# Patient Record
Sex: MALE | Race: WHITE
[De-identification: names, ages, dates, MRNs, and addresses within clinical notes are randomized per-mention and may not be internally consistent; named-entity substitution may affect disease eponyms.]

---

## 2018-06-19 ENCOUNTER — HOSPITAL ENCOUNTER (OUTPATIENT)
Dept: HOSPITAL 95 - LAB SHORT | Age: 78
Discharge: HOME | End: 2018-06-19
Attending: OPHTHALMOLOGY
Payer: MEDICARE

## 2018-06-19 DIAGNOSIS — H10.231: Primary | ICD-10-CM

## 2021-04-23 NOTE — NUR
PATIENT IS A NEW ADMIT FROM THE ED. THREE PERSON TRANSFER FROM [facility] TO BED.
AXOX 3 TO SELF, PLACE, SPOUSE, AND PRESIDENT BUT NO YEAR/MONTH. RIGHT SIDE
DEFICIT ARM/LEG. SPOUSE PRESENT IN ROOM AND STAYING AS INTERPRETOR PER CHARGE
RN. ARTIFICIAL RIGHT EYE. ON ROOM AIR. MRI SHEET FILLED OUT, SIGNED, AND
FAXED. BEDREST AND NPO AT THIS TIME WITH SLURRED SPEECH. SPOUSE AND PATIENT
ORIENTED TO ROOM AND CALL LIGHT SYSTEM. BED ALARM ACTIVATED WITH HX OF FALLS.
SPOUSE REPORTS SHE WILL BE UNABLE TO CARE FOR HIM AT HOME. PATIENT INDEPENDENT
PRIOR TO EVENT.

## 2021-04-23 NOTE — NUR
SHIFT SUMMARY:
 
NO ACUTE EVENTS. PT ATTEMPTED TO WORK WITH PHYSICAL AND OCCUPATIONAL THERAPY,
BUT BECAME TOO DIZZY TO PARTICIPATE. HEAD MRI AND CAROTID U/S COMPLETED.
TOLERATING PO INTAKE. DENIED PAIN. USING URINAL WITH ASSISTANCE. WIFE AT
BEDSIDE, IS ALLOWED TO SPEND THE NIGHT AS PT SOMETIMES BECOMES CONFUSED.

## 2021-04-24 NOTE — NUR
PT IS A/OX3, PLEASANT AND COOPERATIVE, THE PT IS UP WITH MINIMAL ASSIST TO THE
CHAIR, THE PT APPEARS TO BE BREATHING EASILY ON RA AT THIS TIME, THE PT WAS UP
IN THE CHAIR FOR MOST OF THE DAY, THE PTS WIFE IS AT THE BEDSIDE, TODAY THE PT
WAS ABLE TO WORK WITH THE PHYSICAL THERAPIST AND AMBULATED IN THE ROOM WITH
THE FWW, NO DIZZINESS WAS REPORTED, THE PT WAS STARTED ON BOWEL CARE TODAY PER
HIS REQUEST, CALL LIGHT IN REACH, WILL CONTINUE TO MONITOR AND ASSESS FOR
CHANGES

## 2021-04-24 NOTE — NUR
SHIFT SUMMARY
ASSUMED CARE OF PT AT 1900. 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
NODS HIS HEAD. [place]. LUNG SOUNDS HAVE CRACKLES AT THE BASES. HEART SOUNDS
REGULAR. PT HAS R SIDED WEAKNESS, R ARM IS FLACCID. PT SET THE BED ALARM ONCE
DURING THE SHIFT TO GO TO THE BATHRROM. PT WIFE STAYED WITH PATIENT T/O THE
NIGHT.
 
CALL LIGHT IN REACH, BED IN LOWEST POSTION.

## 2021-04-24 NOTE — NUR
Pt up in chair participating in conversation. Wife is very anxious about
conversation with nursing and pysisicans. She promised him she would never put
him in a nursing home.Slow and carefully reviewed care at the facilities and
that ther is along terms side for care if she sould ever need that service and
the rehab part of the famility and the skills and care provide. She was
relieved but may need reenfocement.
  Lengthy conversation with wife. 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
care for their son five years ago he passed here in this facility. Theraputic
time with them both.
  We also had a conversation about a life plan on advanced directives. Pt and
wife state they live in their own home and have been functioning well. Asked
if they had a spoke to their daughter or had a plan for chilo changes in the
future they admistted they did not. Pt wife stated they do not want to face it
or leave their home. Asked if they wanted information and they agreed. We
reviewed the different levels of care and stategies for staying independent.
Stongly suggested they speak openly with their family and loved ones about
their future care needs. Pt wife stated they have a living trust and advance
directive and she will bring it in. They did not want to talk about code
status. She did ask about care in the home. We setled on agreeing to me
speaking with their daughter tomorrow about future care needs.
  Will monitor pt for symtoms and will attempt a polst and advance directive.

## 2021-04-25 NOTE — NUR
PT IS ALOX3, PLEASANT AND COOPERATIVE, ABLE TO ANSWER QUESTIONS APPROPRIATLY
WITH SOME SPEECH ASPHASIS, THE PT APPEARS TO BE BREATHING EASILY ON RA AT THIS
TIME, THE PT DENIED ANY PAIN T/O THE DAY, THE PT THIS AFTERNOON WAS ABLE TO
AMBULATE AROUND THE MEDICAL FLOOR LOOP USEING THE FWW, MILDLY SOB ON RETURN TO
THE ROOM BUT WAS ABLE TO RECOVER QUIKLY, THE PTS WIFE WAS AT THE BEDSIDE T/O
THE DAY, CALL LIGHT IN REACH, PLAN FOR DISCHARGE TO SNF TOMORROW

## 2021-04-25 NOTE — NUR
SHIFT SUMMARY
ASSUMED CARE OF PT AT 1900. PT IS A/OX4. HEART SOUNDS REGULAR, LUNG SOUNDS
HAVE CRACKLES AT THE BASES. PT HAS NO NEW COMPLAINTS BUT SAYS THAT HE FEELS
ABOUT THE SAME AS YESTERDAY. PT STILL HAVING TROUBLE WITH EXPRESSIVE APHASIA,
PT IS A 1P ASSIST TO BSC.
 
CALL LIGHT IN REACH, BED IN LOWEST POSTION.

## 2021-04-26 NOTE — NUR
PT IS A/OX3, PLEASANT AND COOPERATIVE, THE PT IS UP WITH ASSIST TO THE
BATHROOM USEING THE FWW, THE PT APPEARS TO BE BREATHING EASILY ON RA AT REST,
MILDLY SOB WITH EXCERTION, THE PT WORTED WITH THE PHYSICAL, OCCUPATIONAL AND
SPEECH THERAPIST TODAY AND WAS ABLE TO TOLERATE, THE PTS WIFE WAS AT THE
BEDSIDE T/O THE DAY, PLAN IS FOR THE PT TO DISCHARGE TO SNF EITHER TODAY OR
TOMMOROW IF HE QUALIFEIS, CALL LIGHT IN REACH, WILL CONTINUE TO MONITOR AND
ASSESS FOR CHANGES

## 2021-04-26 NOTE — NUR
SHIFT SUMMARY
PT AWAKE OFF AND ON THROUGHOUT THE NIGHT. UP APPROX EVERY HOUR TO USE THE
RESTROOM. CALLED APPROPRIATELY. HAD THREE BOWEL MOVEMENTS, ALL SMALL, TAN, AND
UNFORMED. BOWEL CARE HELD THIS EVENING. PT HAS R SIDED WEAKNESS. UNABLE TO
UNBEND R ARM OR SQUEEZE MY HAND WITH HIS R HAND. WAS ABLE TO LIFT R ARM UP. R
LEG WEAK. SPEECH IS SLURRED WITH SOME EXPRESSIVE APHASIA. A/O X 3-4 TONIGHT.
WIFE REMAINED AT BEDSIDE DUE TO PT HAVING HX OF GETTING CONFUSED. PT DENIED
PAIN. VITAL SIGNS STABLE. WILL CONTINUE TO MONITOR AND REPORT TO DAY RN.

## 2021-04-27 NOTE — NUR
REPORT CALLED TO RON AT [facility].  PT TO [facility] VIA W/C WITH [facility] W/C SERVICE TRANSPORTING.  WIFE TOOK BELONGINGS WITH HER.

## 2021-04-27 NOTE — NUR
CTA/NURSING STUDENT
I HAVE ASSESSED THIS PT. I HAVE READ THE NURSING STUDENT'S DOCUMENTATION AND I
AGREE WITH IT. SHIFT SUMMARY IN NURSING STUDENT NOTES

## 2021-04-27 NOTE — NUR
SHIFT SUMMARY
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 FULL CODE. PLAN FOR
DISCHARGE TODAY TO SNF OR WITH HOME HEALTH. PT APPEARED INCREASINGLY AGITATED
WITH ASSISTANCE THROUGHOUT THE SHIFT. MULTIPLE RESTROOM REQUESTS. PT IS
SLEEPING AT THIS TIME.